# Patient Record
Sex: MALE | Race: WHITE | NOT HISPANIC OR LATINO | Employment: OTHER | URBAN - METROPOLITAN AREA
[De-identification: names, ages, dates, MRNs, and addresses within clinical notes are randomized per-mention and may not be internally consistent; named-entity substitution may affect disease eponyms.]

---

## 2018-07-05 ENCOUNTER — HOSPITAL ENCOUNTER (EMERGENCY)
Facility: HOSPITAL | Age: 68
Discharge: LEFT BEFORE DISCHARGE | End: 2018-07-05
Attending: EMERGENCY MEDICINE
Payer: OTHER GOVERNMENT

## 2018-07-05 ENCOUNTER — APPOINTMENT (EMERGENCY)
Dept: CT IMAGING | Facility: HOSPITAL | Age: 68
End: 2018-07-05
Payer: OTHER GOVERNMENT

## 2018-07-05 VITALS
TEMPERATURE: 97.6 F | DIASTOLIC BLOOD PRESSURE: 98 MMHG | OXYGEN SATURATION: 96 % | RESPIRATION RATE: 16 BRPM | HEART RATE: 80 BPM | SYSTOLIC BLOOD PRESSURE: 162 MMHG | WEIGHT: 182.32 LBS
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
## 2018-07-05 DIAGNOSIS — S22.089A CLOSED T12 FRACTURE (HCC): ICD-10-CM

## 2018-07-05 LAB
ANION GAP SERPL CALCULATED.3IONS-SCNC: 7 MMOL/L (ref 4–13)
ATRIAL RATE: 77 BPM
BASOPHILS # BLD AUTO: 0.02 THOUSANDS/ΜL (ref 0–0.1)
BASOPHILS NFR BLD AUTO: 0 % (ref 0–1)
BUN SERPL-MCNC: 24 MG/DL (ref 5–25)
CALCIUM SERPL-MCNC: 8.6 MG/DL (ref 8.3–10.1)
CHLORIDE SERPL-SCNC: 107 MMOL/L (ref 100–108)
CO2 SERPL-SCNC: 27 MMOL/L (ref 21–32)
CREAT SERPL-MCNC: 1.29 MG/DL (ref 0.6–1.3)
EOSINOPHIL # BLD AUTO: 0.01 THOUSAND/ΜL (ref 0–0.61)
EOSINOPHIL NFR BLD AUTO: 0 % (ref 0–6)
ERYTHROCYTE [DISTWIDTH] IN BLOOD BY AUTOMATED COUNT: 13.5 % (ref 11.6–15.1)
GFR SERPL CREATININE-BSD FRML MDRD: 56 ML/MIN/1.73SQ M
GLUCOSE SERPL-MCNC: 132 MG/DL (ref 65–140)
HCT VFR BLD AUTO: 42.1 % (ref 36.5–49.3)
HGB BLD-MCNC: 13.9 G/DL (ref 12–17)
IMM GRANULOCYTES # BLD AUTO: 0.08 THOUSAND/UL (ref 0–0.2)
IMM GRANULOCYTES NFR BLD AUTO: 1 % (ref 0–2)
LYMPHOCYTES # BLD AUTO: 0.73 THOUSANDS/ΜL (ref 0.6–4.47)
LYMPHOCYTES NFR BLD AUTO: 9 % (ref 14–44)
MCH RBC QN AUTO: 31.8 PG (ref 26.8–34.3)
MCHC RBC AUTO-ENTMCNC: 33 G/DL (ref 31.4–37.4)
MCV RBC AUTO: 96 FL (ref 82–98)
MONOCYTES # BLD AUTO: 0.42 THOUSAND/ΜL (ref 0.17–1.22)
MONOCYTES NFR BLD AUTO: 5 % (ref 4–12)
NEUTROPHILS # BLD AUTO: 6.67 THOUSANDS/ΜL (ref 1.85–7.62)
NEUTS SEG NFR BLD AUTO: 85 % (ref 43–75)
NRBC BLD AUTO-RTO: 0 /100 WBCS
P AXIS: 68 DEGREES
PLATELET # BLD AUTO: 134 THOUSANDS/UL (ref 149–390)
PMV BLD AUTO: 10 FL (ref 8.9–12.7)
POTASSIUM SERPL-SCNC: 4.3 MMOL/L (ref 3.5–5.3)
PR INTERVAL: 188 MS
QRS AXIS: -7 DEGREES
QRSD INTERVAL: 94 MS
QT INTERVAL: 376 MS
QTC INTERVAL: 425 MS
RBC # BLD AUTO: 4.37 MILLION/UL (ref 3.88–5.62)
SODIUM SERPL-SCNC: 141 MMOL/L (ref 136–145)
T WAVE AXIS: 55 DEGREES
TROPONIN I SERPL-MCNC: <0.02 NG/ML
VENTRICULAR RATE: 77 BPM
WBC # BLD AUTO: 7.93 THOUSAND/UL (ref 4.31–10.16)

## 2018-07-05 PROCEDURE — 93010 ELECTROCARDIOGRAM REPORT: CPT | Performed by: INTERNAL MEDICINE

## 2018-07-05 PROCEDURE — 93005 ELECTROCARDIOGRAM TRACING: CPT

## 2018-07-05 PROCEDURE — 96361 HYDRATE IV INFUSION ADD-ON: CPT

## 2018-07-05 PROCEDURE — 85025 COMPLETE CBC W/AUTO DIFF WBC: CPT | Performed by: EMERGENCY MEDICINE

## 2018-07-05 PROCEDURE — 74177 CT ABD & PELVIS W/CONTRAST: CPT

## 2018-07-05 PROCEDURE — 99284 EMERGENCY DEPT VISIT MOD MDM: CPT

## 2018-07-05 PROCEDURE — 96374 THER/PROPH/DIAG INJ IV PUSH: CPT

## 2018-07-05 PROCEDURE — 84484 ASSAY OF TROPONIN QUANT: CPT | Performed by: EMERGENCY MEDICINE

## 2018-07-05 PROCEDURE — 80048 BASIC METABOLIC PNL TOTAL CA: CPT | Performed by: EMERGENCY MEDICINE

## 2018-07-05 PROCEDURE — 36415 COLL VENOUS BLD VENIPUNCTURE: CPT | Performed by: EMERGENCY MEDICINE

## 2018-07-05 RX ORDER — ACETAMINOPHEN 325 MG/1
975 TABLET ORAL ONCE
Status: COMPLETED | OUTPATIENT
Start: 2018-07-05 | End: 2018-07-05

## 2018-07-05 RX ORDER — ACETAMINOPHEN 500 MG
500 TABLET ORAL EVERY 6 HOURS PRN
Qty: 30 TABLET | Refills: 0 | Status: SHIPPED | OUTPATIENT
Start: 2018-07-05

## 2018-07-05 RX ORDER — OXYCODONE HYDROCHLORIDE 5 MG/1
5 TABLET ORAL EVERY 4 HOURS PRN
Qty: 10 TABLET | Refills: 0 | Status: SHIPPED | OUTPATIENT
Start: 2018-07-05

## 2018-07-05 RX ORDER — LIDOCAINE 50 MG/G
1 PATCH TOPICAL ONCE
Status: DISCONTINUED | OUTPATIENT
Start: 2018-07-05 | End: 2018-07-05 | Stop reason: HOSPADM

## 2018-07-05 RX ORDER — KETOROLAC TROMETHAMINE 30 MG/ML
15 INJECTION, SOLUTION INTRAMUSCULAR; INTRAVENOUS ONCE
Status: COMPLETED | OUTPATIENT
Start: 2018-07-05 | End: 2018-07-05

## 2018-07-05 RX ORDER — NAPROXEN 500 MG/1
500 TABLET ORAL 2 TIMES DAILY WITH MEALS
Qty: 30 TABLET | Refills: 0 | Status: SHIPPED | OUTPATIENT
Start: 2018-07-05

## 2018-07-05 RX ORDER — OXYCODONE HYDROCHLORIDE 5 MG/1
5 TABLET ORAL ONCE
Status: COMPLETED | OUTPATIENT
Start: 2018-07-05 | End: 2018-07-05

## 2018-07-05 RX ADMIN — SODIUM CHLORIDE 1000 ML: 0.9 INJECTION, SOLUTION INTRAVENOUS at 09:59

## 2018-07-05 RX ADMIN — OXYCODONE HYDROCHLORIDE 5 MG: 5 TABLET ORAL at 11:32

## 2018-07-05 RX ADMIN — IOHEXOL 100 ML: 350 INJECTION, SOLUTION INTRAVENOUS at 10:55

## 2018-07-05 RX ADMIN — KETOROLAC TROMETHAMINE 15 MG: 30 INJECTION, SOLUTION INTRAMUSCULAR at 10:03

## 2018-07-05 RX ADMIN — LIDOCAINE 1 PATCH: 50 PATCH CUTANEOUS at 10:04

## 2018-07-05 RX ADMIN — ACETAMINOPHEN 975 MG: 325 TABLET, FILM COATED ORAL at 10:01

## 2018-07-05 NOTE — DISCHARGE INSTRUCTIONS
Vertebral Compression Fracture   WHAT YOU NEED TO KNOW:   A vertebral compression fracture (VCF) is a break in a part of the vertebra  Vertebrae are the round, strong bones that form your spine  VCFs most often occur in the thoracic (middle) and lumbar (lower) areas of your spine  Fractures may be mild to severe  DISCHARGE INSTRUCTIONS:   Medicines: You may need any of the following:  · NSAIDs , such as ibuprofen, help decrease swelling, pain, and fever  This medicine is available with or without a doctor's order  NSAIDs can cause stomach bleeding or kidney problems in certain people  If you take blood thinner medicine, always ask if NSAIDs are safe for you  Always read the medicine label and follow directions  Do not give these medicines to children under 10months of age without direction from your child's healthcare provider  · Acetaminophen  decreases pain and fever  It is available without a doctor's order  Ask how much to take and how often to take it  Follow directions  Acetaminophen can cause liver damage if not taken correctly  · Prescription pain medicine  may be given  Ask your healthcare provider how to take this medicine safely  · Bisphosphonates and calcitonin  may be recommended to help your bones get stronger  They can decrease the pain of a VCF caused by osteoporosis, and decrease your risk for another fracture  · Take your medicine as directed  Contact your healthcare provider if you think your medicine is not helping or if you have side effects  Tell him or her if you are allergic to any medicine  Keep a list of the medicines, vitamins, and herbs you take  Include the amounts, and when and why you take them  Bring the list or the pill bottles to follow-up visits  Carry your medicine list with you in case of an emergency  Follow up with your healthcare provider as directed: You may need to return for x-rays or other tests   Write down your questions so you remember to ask them during your visits  Heat and ice:   · Apply ice  on your back for 15 to 20 minutes every hour or as directed  Use an ice pack, or put crushed ice in a plastic bag  Cover it with a towel  Ice helps prevent tissue damage and decreases swelling and pain  · Apply heat  on your back for 20 to 30 minutes every 2 hours for as many days as directed  Heat helps decrease pain and muscle spasms  Activity:   · Avoid activities that may make the pain worse, such as picking up heavy objects  When the pain decreases, begin normal, slow movements as directed by your healthcare provider  Your healthcare provider may have you do weight-bearing exercises such as walking  You may also do non-weight-bearing exercises such as swimming and bicycling  · You may need to use a walker or cane  Ask your healthcare provider for more information about how to use a cane or a walker  · When you  objects, bend at the hips and knees  Never bend from the waist only  Use bent knees and your leg muscles as you lift the object  While you lift the object, keep it close to your chest  Try not to twist or lift anything above your waist   Physical and occupational therapy:  Your healthcare provider may recommend physical and occupational therapy  A physical therapist teaches you exercises to help improve movement and strength, and to decrease pain  An occupational therapist teaches you skills to help with your daily activities  Manage pain during sleep:   · Do not sleep on a waterbed  Waterbeds do not provide good back support  · Sleep on a firm mattress  You may also put a ½ to 1-inch piece of plywood between the mattress and box spring  · Sleep on your back with a pillow under your knees  This will decrease pressure on your back  You may also sleep on your side with 1 or both of your knees bent and a pillow between them  It may also be helpful to sleep on your stomach with a pillow under you at waist level    Contact your healthcare provider if:   · You are not hungry, and you are losing weight  · You cannot sleep or rest because of back pain  · You have pain or swelling in your back that is getting worse, or does not go away  · You have questions or concerns about your condition or care  Return to the emergency department if:   · You feel lightheaded, short of breath, and have chest pain  · You cough up blood  · Your arm or leg feels warm, tender, and painful  It may look swollen and red  · You have new problems urinating or having bowel movements  · You have severe pain in your back after falling, bending forward, sneezing, or coughing strongly  · You suddenly cannot feel your legs  · You suddenly have trouble moving your arms or legs  © 2017 [facility] Information is for End User's use only and may not be sold, redistributed or otherwise used for commercial purposes  All illustrations and images included in CareNotes® are the copyrighted property of A D A M , Inc  or Miguel Chau  The above information is an  only  It is not intended as medical advice for individual conditions or treatments  Talk to your doctor, nurse or pharmacist before following any medical regimen to see if it is safe and effective for you

## 2018-07-05 NOTE — ED NOTES
Pt ambulated with assistance to the bathroom   Pt has steady gait with tremors, ambulates at baseline mobility given history or Jennifer 38, RN  07/05/18 9055

## 2018-07-05 NOTE — ED NOTES
Patient transported to [facility], RN  07/05/18 9949

## 2018-07-05 NOTE — ED PROVIDER NOTES
History  Chief Complaint   Patient presents with   [place] Day Fall     pt states that he was on his way out of the bathroom and lost his balance going backwards causing him to fall  c/o lower back pain  denies any head injury or loss of consciousness     HPI  56-year-old male history of Parkinson's disease presents after mechanical fall with low back pain  Patient says he was up around 5 o'clock this morning getting ready to feed his dogs when he lost his balance and fell backwards onto his buttock  Patient denies hitting his head or any loss of consciousness he denies any lightheadedness or dizziness prior fall  Patient is not on any blood thinners  Patient is complaining of pain in his lumbar area that radiates to the buttock  He was able to get up on his own as well as walk after the fall  He uses a cane at baseline  Patient says he is only on Sinemet he is not on any other medications currently  He does have a history of an aortic valve replacement the past as well  He is complaining of pain in his low back as well as buttock  He denies any pain anywhere else no weakness, numbness, tingling, chest pain, shortness of breath, headache, neck stiffness, speech difficulties  Patient denies any recent fevers or chills  Twelve systems reviewed otherwise negative except as stated in HPI  Impression and plan 56-year-old male who presents status post fall with low back/buttock pain  He says fall was mechanical in nature  Complaining of pain in the lumbar area as well as buttock  He does have a history of Parkinson's disease he is neurovascularly intact on exam not on any blood thinners  I will get CT abdomen pelvis to rule out injury check basic labs as well an EKG and troponin treat pain reassess  None       Past Medical History:   Diagnosis Date    Parkinson's disease [facility])        Past Surgical History:   Procedure Laterality Date    AORTIC VALVE REPLACEMENT      CARDIAC SURGERY         History reviewed  No pertinent family history  I have reviewed and agree with the history as documented  Social History   Substance Use Topics    Smoking status: Never Smoker    Smokeless tobacco: Never Used    Alcohol use No        Review of Systems   Constitutional: Negative for activity change, chills and fever  HENT: Negative for trouble swallowing and voice change  Eyes: Negative for photophobia  Respiratory: Negative for shortness of breath  Cardiovascular: Negative for chest pain, palpitations and leg swelling  Gastrointestinal: Negative for abdominal pain, diarrhea, nausea and vomiting  Endocrine: Negative for polyuria  Genitourinary: Negative for dysuria  Musculoskeletal: Negative for arthralgias, back pain, gait problem, joint swelling, myalgias, neck pain and neck stiffness  Skin: Negative for rash and wound  Allergic/Immunologic: Negative  Neurological: Negative for dizziness, tremors, seizures, syncope, facial asymmetry, speech difficulty, weakness, light-headedness, numbness and headaches  Hematological: Negative for adenopathy  Does not bruise/bleed easily  Psychiatric/Behavioral: Negative for agitation  Physical Exam  Physical Exam   Constitutional: He is oriented to person, place, and time  He appears well-developed and well-nourished  No distress  HENT:   Head: Normocephalic and atraumatic  Right Ear: No hemotympanum  Left Ear: No hemotympanum  Nose: No nasal septal hematoma  Mouth/Throat: Uvula is midline and oropharynx is clear and moist  No oropharyngeal exudate  Eyes: EOM are normal  Pupils are equal, round, and reactive to light  Right eye exhibits no discharge  Left eye exhibits no discharge  No scleral icterus  Neck: Normal range of motion  Neck supple  No JVD present  No tracheal deviation present  No thyromegaly present  No -c-spine tenderness   Cardiovascular: Normal rate, regular rhythm, normal heart sounds and intact distal pulses    Exam reveals no gallop and no friction rub  No murmur heard  Pulmonary/Chest: Effort normal and breath sounds normal  No stridor  No respiratory distress  He has no wheezes  He has no rales  He exhibits no tenderness  Abdominal: Soft  Bowel sounds are normal  He exhibits no distension and no mass  There is no tenderness  There is no rebound and no guarding  No hernia  Musculoskeletal: Normal range of motion  He exhibits no edema, tenderness or deformity  No t, l spine tenderness  No stepoff/deformities  No skin changes  L lumbar paraspinal tenderness, L buttock tenderness  No ecchymosis or skin changes   Lymphadenopathy:     He has no cervical adenopathy  Neurological: He is alert and oriented to person, place, and time  He has normal strength and normal reflexes  He is not disoriented  No cranial nerve deficit or sensory deficit  GCS eye subscore is 4  GCS verbal subscore is 5  GCS motor subscore is 6  Reflex Scores:       Patellar reflexes are 2+ on the right side and 2+ on the left side  Achilles reflexes are 2+ on the right side and 2+ on the left side  Cn 2-12 grossly intact  No pronator drift  Normal gait  Normal strength/sensation   Skin: Skin is warm and dry  Capillary refill takes less than 2 seconds  He is not diaphoretic  No erythema  No pallor  Psychiatric: He has a normal mood and affect  Nursing note and vitals reviewed        Vital Signs  ED Triage Vitals [07/05/18 0922]   Temperature Pulse Respirations Blood Pressure SpO2   97 6 °F (36 4 °C) 81 16 (!) 175/89 96 %      Temp Source Heart Rate Source Patient Position - Orthostatic VS BP Location FiO2 (%)   Oral Monitor Sitting Right arm --      Pain Score       9           Vitals:    07/05/18 0922 07/05/18 1111 07/05/18 1243   BP: (!) 175/89 165/63 162/98   Pulse: 81 72 80   Patient Position - Orthostatic VS: Sitting Lying Lying       Visual Acuity  Visual Acuity      Most Recent Value   L Pupil Size (mm)  4   R Pupil Size (mm)  4 ED Medications  Medications   sodium chloride 0 9 % bolus 1,000 mL (0 mL Intravenous Stopped 7/5/18 1135)   ketorolac (TORADOL) injection 15 mg (15 mg Intravenous Given 7/5/18 1003)   acetaminophen (TYLENOL) tablet 975 mg (975 mg Oral Given 7/5/18 1001)   iohexol (OMNIPAQUE) 350 MG/ML injection (MULTI-DOSE) 100 mL (100 mL Intravenous Given 7/5/18 1055)   oxyCODONE (ROXICODONE) IR tablet 5 mg (5 mg Oral Given 7/5/18 1132)       Diagnostic Studies  Results Reviewed     Procedure Component Value Units Date/Time    Troponin I [[id number]]  (Normal) Collected:  07/05/18 1001    Lab Status:  Final result Specimen:  Blood from Arm, Right Updated:  07/05/18 1031     Troponin I <0 02 ng/mL     Basic metabolic panel [[id number]] Collected:  07/05/18 1001    Lab Status:  Final result Specimen:  Blood from Arm, Right Updated:  07/05/18 1022     Sodium 141 mmol/L      Potassium 4 3 mmol/L      Chloride 107 mmol/L      CO2 27 mmol/L      Anion Gap 7 mmol/L      BUN 24 mg/dL      Creatinine 1 29 mg/dL      Glucose 132 mg/dL      Calcium 8 6 mg/dL      eGFR 56 ml/min/1 73sq m     Narrative:         National Kidney Disease Education Program recommendations are as follows:  GFR calculation is accurate only with a steady state creatinine  Chronic Kidney disease less than 60 ml/min/1 73 sq  meters  Kidney failure less than 15 ml/min/1 73 sq  meters      CBC and differential [[id number]]  (Abnormal) Collected:  07/05/18 1001    Lab Status:  Final result Specimen:  Blood from Arm, Right Updated:  07/05/18 1013     WBC 7 93 Thousand/uL      RBC 4 37 Million/uL      Hemoglobin 13 9 g/dL      Hematocrit 42 1 %      MCV 96 fL      MCH 31 8 pg      MCHC 33 0 g/dL      RDW 13 5 %      MPV 10 0 fL      Platelets 319 (L) Thousands/uL      nRBC 0 /100 WBCs      Neutrophils Relative 85 (H) %      Immat GRANS % 1 %      Lymphocytes Relative 9 (L) %      Monocytes Relative 5 %      Eosinophils Relative 0 %      Basophils Relative 0 %      Neutrophils Absolute 6 67 Thousands/µL      Immature Grans Absolute 0 08 Thousand/uL      Lymphocytes Absolute 0 73 Thousands/µL      Monocytes Absolute 0 42 Thousand/µL      Eosinophils Absolute 0 01 Thousand/µL      Basophils Absolute 0 02 Thousands/µL                  CT abdomen pelvis with contrast   Final Result by Kishan Albert MD (07/05 1113)      T12  fracture with about 20-25% loss of vertebral height with intact and posterior arches  No widening of the interspinous space and no central canal narrowing      Bibasilar density seen suggests atelectasis  Consider follow-up at 3 months to demonstrate resolution of the basilar opacities in the lung   No solid visceral injury seen      The study was marked in EPIC for immediate notification  Workstation performed: [id number]                    Procedures  ECG 12 Lead Documentation  Date/Time: 7/5/2018 10:22 AM  Performed by: Natali Knutson by: Aleksandr Larkin     ECG reviewed by me, the ED Provider: yes    Patient location:  ED  Previous ECG:     Previous ECG:  Unavailable  Interpretation:     Interpretation: normal    Rate:     ECG rate:  77    ECG rate assessment: normal    Rhythm:     Rhythm: sinus rhythm    Ectopy:     Ectopy: none    QRS:     QRS axis:  Normal  Conduction:     Conduction: normal    ST segments:     ST segments:  Normal  T waves:     T waves: normal             Phone Contacts  ED Phone Contact    ED Course  ED Course as of Jul 05 1923   Thu Jul 05, 2018   1028 Creatinine: 1 29   1124 Pt updated about t12  fracture  He is not sure if he wants to stay because [facility]  I will give pt  Oxycodone, disc for ct results, reassess   If he decides to leave, he will have to sign out ama                                MDM  CritCare Time    Disposition  Final diagnoses:   Fall   Closed T12 fracture ([address] )     Time reflects when diagnosis was documented in both MDM as applicable and the Disposition within this note     Time User Action Codes Description Comment    7/5/2018 12:02 PM Chasidy Jain Add [E72  XXXA] Fall     7/5/2018 12:02 PM Jamessohail Sena GROVE Add [C11 [id number]] Closed T12 fracture [facility])       ED Disposition     ED Disposition Condition Comment    AMA  Date: 7/5/2018  Patient: Kennedy Castaneda  Admitted: 7/5/2018  9:19 AM  Attending Provider: Shakila Mak MD    Kennedy Castaneda or his authorized caregiver has made the decision for the patient to leave the emergency department against the advice of [facility] emergency department staff  He or his authorized caregiver has been informed and understands the inherent risks, including death, permanent disability  He or his authorized caregiver has decided to accept the responsibility for this decision  Kennedy Castaneda and all necessary parties have been advised that he may return for further evaluation or treatment  His condition at time of discharge was stable    Kennedy Castaneda had current vital signs as follows:  /63 (BP Location: Left arm)   Pulse  72   Temp 97 6 °F (36 4 °C) (Oral)   Resp 20   Wt 82 7 kg (182 lb 5 1 oz)         Follow-up Information     Follow up With Specialties Details Why Contact Info Additional Information    [address] [facility] In 2 days  [address] Emergency Department Emergency Medicine  If symptoms worsen [address] [facility] [address]  [phone] [place] ED, [address] [place], [place], [address]          Discharge Medication List as of 7/5/2018 12:04 PM      START taking these medications    Details   acetaminophen (TYLENOL) 500 mg tablet Take 1 tablet (500 mg total) by mouth every 6 (six) hours as needed for mild pain, Starting Thu 7/5/2018, Print      naproxen (NAPROSYN) 500 mg tablet Take 1 tablet (500 mg total) by mouth 2 (two) times a day with meals, Starting Thu 7/5/2018, Print      oxyCODONE (ROXICODONE) 5 mg immediate release tablet Take 1 tablet (5 mg total) by mouth every 4 (four) hours as needed for moderate pain for up to 10 doses Max Daily Amount: 30 mg, Starting Thu 7/5/2018, Print           No discharge procedures on file      ED Provider  Electronically Signed by           Dat Dave MD  07/05/18 6115

## 2018-08-13 ENCOUNTER — EVALUATION (OUTPATIENT)
Dept: PHYSICAL THERAPY | Facility: MEDICAL CENTER | Age: 68
End: 2018-08-13
Payer: OTHER GOVERNMENT

## 2018-08-13 DIAGNOSIS — R26.89 BALANCE PROBLEM: Primary | ICD-10-CM

## 2018-08-13 PROCEDURE — G8978 MOBILITY CURRENT STATUS: HCPCS | Performed by: PHYSICAL THERAPIST

## 2018-08-13 PROCEDURE — G8979 MOBILITY GOAL STATUS: HCPCS | Performed by: PHYSICAL THERAPIST

## 2018-08-13 PROCEDURE — 97110 THERAPEUTIC EXERCISES: CPT | Performed by: PHYSICAL THERAPIST

## 2018-08-13 PROCEDURE — 97162 PT EVAL MOD COMPLEX 30 MIN: CPT | Performed by: PHYSICAL THERAPIST

## 2018-08-13 NOTE — PROGRESS NOTES
PT Evaluation     Today's date: 2018  Patient name: Ariadna Kinney  : 1950  MRN: [id number]  Referring provider: Wiliam Velasco MD  Dx:   Encounter Diagnosis     ICD-10-CM    1  Balance problem R26 89                   Assessment  Impairments: abnormal gait, abnormal movement, impaired physical strength and safety issue    Assessment details: Ariadna Kinney is a 69 y o  male who presents with decreased strength, ambulatory dysfunction, and impaired balance  Due to these impairments, patient has difficulty performing a/iadls  Patient's clinical presentation is consistent with their referring diagnosis of balance/gait dysfunction  Patient would benefit from skilled physical therapy to address their aforementioned impairments, improve their level of function and to improve their overall quality of life  Understanding of Dx/Px/POC: good   Prognosis: fair    Goals  Short term goals  to be achieved in 4 weeks:     Increase strength by 1/2 grade  Balance will be improved as indicated by tandem stance of at least 30 seconds  Long term goals  to be achieved by discharge:    Ambulation is improved to maximal level of function  Squatting is improved to maximal level of function  Stair climbing is improved to maximal level of function  IADL performance in related activities is improved to maximal level of function  Plan  Planned therapy interventions: neuromuscular re-education, patient education, strengthening, stretching, therapeutic activities, therapeutic exercise, gait training and home exercise program  Frequency: 2x week  Duration in visits: 12  Duration in weeks: 6  Plan of Care beginning date: 2018  Plan of Care expiration date: 2018  Treatment plan discussed with: patient        Subjective Evaluation    History of Present Illness  Mechanism of injury: Patient states decreased gait and balance over the past six months attributed to Parkinson's disease    Patient sustained fall on 18 when he fell backwards in his house; patient received CT scan which revealed T12 fracture with approximately 20-25% loss of vertebral height  Patient states he sustained another fall in the spring from loss of balance; states no significant injury from that fall  Patient states he uses a SPC in his home during the evening and outside of his home as needed  Patient has not received recent PT for balance/gait dysfunction    Pain  Current pain ratin  At best pain ratin  At worst pain ratin          Objective     Strength/Myotome Testing     Left Hip   Planes of Motion   Flexion: 4  Extension: 4  Abduction: 4  Adduction: 4    Right Hip   Planes of Motion   Flexion: 4  Extension: 4  Abduction: 4  Adduction: 4    Left Knee   Flexion: 4+  Extension: 4+    Right Knee   Flexion: 4+  Extension: 4+    Left Ankle/Foot   Dorsiflexion: 4+  Plantar flexion: 4+    Right Ankle/Foot   Dorsiflexion: 4+  Plantar flexion: 4+    Functional Assessment     Comments  Tandem stance: loss of balance 8 seconds  Standing feet together firm surface/eyes closed: moderate sway  Single limb stance: left LE: 3 seconds, loss of balance, right LE: 4 seconds, loss of balance      Flowsheet Rows      Most Recent Value   PT/OT G-Codes   Current Score  79   FOTO information reviewed  Yes   Assessment Type  Evaluation   G code set  Mobility: Walking & Moving Around   Mobility: Walking and Moving Around Current Status ()  CK   Mobility: Walking and Moving Around Goal Status ()  CJ          Precautions: Parkinson's disease, Aortic valve replacement - 2009    Daily Treatment Diary     Manual                                                                                   Exercise Diary              Bike             Mini squats HEP            Heel raises             Tandem stance HEP            S/S stepping             Tandem stepping             Biodex - weight shift             Biodex - LOS SLR flex HEP            SLR abd HEP            Bridging             Iso hip add                                                                                                                           Modalities

## 2018-08-13 NOTE — LETTER
2018    Bekah Jean Baptiste, [address] [facility]  [address] [place] [address] Sudha Earl [facility] [address]    Patient: Prince Ford   YOB: 1950   Date of Visit: 2018     Encounter Diagnosis     ICD-10-CM    1  Balance problem R26 89        Dear Dr Roger Mirza:    Please review the attached Plan of Care from [address] [place] [address] recent visit  Please verify that you agree therapy should continue by signing the attached document and sending it back to our office  If you have any questions or concerns, please don't hesitate to call  Sincerely,    Yanelis Meza, PT      Referring Provider:      I certify that I have read the below Plan of Care and certify the need for these services furnished under this plan of treatment while under my care  Bekah Jean Baptiste MD  [address] [place] [address]  VIA Facsimile: [phone]          PT Evaluation     Today's date: 2018  Patient name: Prince Ford  : 1950  MRN: [id number]  Referring provider: Suzanne Delarosa MD  Dx:   Encounter Diagnosis     ICD-10-CM    1  Balance problem R26 89                   Assessment  Impairments: abnormal gait, abnormal movement, impaired physical strength and safety issue    Assessment details: Prince Ford is a 69 y o  male who presents with decreased strength, ambulatory dysfunction, and impaired balance  Due to these impairments, patient has difficulty performing a/iadls  Patient's clinical presentation is consistent with their referring diagnosis of balance/gait dysfunction  Patient would benefit from skilled physical therapy to address their aforementioned impairments, improve their level of function and to improve their overall quality of life  Understanding of Dx/Px/POC: good   Prognosis: fair    Goals  Short term goals  to be achieved in 4 weeks:     Increase strength by 1/2 grade     Balance will be improved as indicated by tandem stance of at least 30 seconds  Long term goals  to be achieved by discharge:    Ambulation is improved to maximal level of function  Squatting is improved to maximal level of function  Stair climbing is improved to maximal level of function  IADL performance in related activities is improved to maximal level of function  Plan  Planned therapy interventions: neuromuscular re-education, patient education, strengthening, stretching, therapeutic activities, therapeutic exercise, gait training and home exercise program  Frequency: 2x week  Duration in visits: 12  Duration in weeks: 6  Plan of Care beginning date: 2018  Plan of Care expiration date: 2018  Treatment plan discussed with: patient        Subjective Evaluation    History of Present Illness  Mechanism of injury: Patient states decreased gait and balance over the past six months attributed to Parkinson's disease  Patient sustained fall on 18 when he fell backwards in his house; patient received CT scan which revealed T12 fracture with approximately 20-25% loss of vertebral height  Patient states he sustained another fall in the spring from loss of balance; states no significant injury from that fall  Patient states he uses a SPC in his home during the evening and outside of his home as needed  Patient has not received recent PT for balance/gait dysfunction    Pain  Current pain ratin  At best pain ratin  At worst pain ratin          Objective     Strength/Myotome Testing     Left Hip   Planes of Motion   Flexion: 4  Extension: 4  Abduction: 4  Adduction: 4    Right Hip   Planes of Motion   Flexion: 4  Extension: 4  Abduction: 4  Adduction: 4    Left Knee   Flexion: 4+  Extension: 4+    Right Knee   Flexion: 4+  Extension: 4+    Left Ankle/Foot   Dorsiflexion: 4+  Plantar flexion: 4+    Right Ankle/Foot   Dorsiflexion: 4+  Plantar flexion: 4+    Functional Assessment     Comments  Tandem stance: loss of balance 8 seconds  Standing feet together firm surface/eyes closed: moderate sway  Single limb stance: left LE: 3 seconds, loss of balance, right LE: 4 seconds, loss of balance      Flowsheet Rows      Most Recent Value   PT/OT G-Codes   Current Score  79   FOTO information reviewed  Yes   Assessment Type  Evaluation   G code set  Mobility: Walking & Moving Around   Mobility: Walking and Moving Around Current Status ()  CK   Mobility: Walking and Moving Around Goal Status ()  CJ          Precautions: Parkinson's disease, Aortic valve replacement - 4/2009    Daily Treatment Diary     Manual                                                                                   Exercise Diary  8/13            Bike             Mini squats HEP            Heel raises             Tandem stance HEP            S/S stepping             Tandem stepping             Biodex - weight shift             Biodex - LOS             SLR flex HEP            SLR abd HEP            Bridging             Iso hip add                                                                                                                           Modalities

## 2018-08-14 ENCOUNTER — TRANSCRIBE ORDERS (OUTPATIENT)
Dept: PHYSICAL THERAPY | Facility: MEDICAL CENTER | Age: 68
End: 2018-08-14

## 2018-08-14 DIAGNOSIS — R26.89 HABITUAL TOEWALKING: Primary | ICD-10-CM

## 2018-08-17 ENCOUNTER — OFFICE VISIT (OUTPATIENT)
Dept: PHYSICAL THERAPY | Facility: MEDICAL CENTER | Age: 68
End: 2018-08-17
Payer: OTHER GOVERNMENT

## 2018-08-17 DIAGNOSIS — R26.89 BALANCE PROBLEM: Primary | ICD-10-CM

## 2018-08-17 PROCEDURE — 97112 NEUROMUSCULAR REEDUCATION: CPT | Performed by: PHYSICAL THERAPIST

## 2018-08-17 PROCEDURE — 97110 THERAPEUTIC EXERCISES: CPT | Performed by: PHYSICAL THERAPIST

## 2018-08-17 NOTE — PROGRESS NOTES
Daily Note     Today's date: 2018  Patient name: Lisa Park  : 1950  MRN: [id number]  Referring provider: Radha Mak MD  Dx:   Encounter Diagnosis     ICD-10-CM    1  Balance problem R26 89                   Subjective: Patient stated moderate difficulty with balance activity in HEP  Objective: See treatment diary below  Precautions: Parkinson's disease, Aortic valve replacement - 2009, FALL RISK     Daily Treatment Diary         Exercise Diary                     Bike    6'                   Mini squats HEP  20x                   HR/TR    30 ea                   Tandem stance HEP  foam2' ea                    S/S stepping    4 laps @ bar                   Tandem stepping    4 laps @ bar                   Biodex - weight shift - s/s + f/b    static 3' ea                    Biodex - LOS    static 3x                   SLR flex HEP  20 ea                   SLR abd HEP  20 ea                   Bridging    3"x20                   Iso hip add     5"x20                                                                                                                                                                                                                   Direct supervision - 8:15 - 8:55       Assessment: Patient demonstrated greatest difficulty with Biodex activities and tandem stance on foam; moderate fatigue noted with activities; no c/o at completion of treatment session  Plan: Continue per plan of care

## 2018-08-21 ENCOUNTER — APPOINTMENT (OUTPATIENT)
Dept: PHYSICAL THERAPY | Facility: MEDICAL CENTER | Age: 68
End: 2018-08-21
Payer: OTHER GOVERNMENT

## 2018-08-23 ENCOUNTER — OFFICE VISIT (OUTPATIENT)
Dept: PHYSICAL THERAPY | Facility: MEDICAL CENTER | Age: 68
End: 2018-08-23
Payer: OTHER GOVERNMENT

## 2018-08-23 DIAGNOSIS — R26.89 BALANCE PROBLEM: Primary | ICD-10-CM

## 2018-08-23 PROCEDURE — 97112 NEUROMUSCULAR REEDUCATION: CPT

## 2018-08-23 NOTE — PROGRESS NOTES
Daily Note     Today's date: 2018  Patient name: Sangeeta Vincent  : 1950  MRN: [id number]  Referring provider: Adrianna Tobias MD  Dx:   Encounter Diagnosis     ICD-10-CM    1  Balance problem R26 89                   Subjective: Patient denies any recent falls  He also denies any changes in balance since starting PT  Objective: See treatment diary below  Precautions: Parkinson's disease, Aortic valve replacement - 2009, FALL RISK     Daily Treatment Diary         Exercise Diary                   Bike    6'  5 '                 Mini squats HEP  20x  20x                 HR/TR    30 ea  30ea                 Tandem stance HEP  foam2' ea   2' ea                 S/S stepping    4 laps @ bar  4 laps                 Tandem stepping    4 laps @ bar  4 laps                 Biodex - weight shift - s/s + f/b    static 3' ea  Held unable                 Biodex - LOS    static 3x Held unable                 SLR flex HEP  20 ea  20 ea                  SLR abd HEP  20 ea  20 ea                 Bridging    3"x20  3"x20                 Iso hip add     5"x20  5"x20                                                                                                                                                                                                                        Assessment: Patient unable to perform biodex today secondary to dyskinesia  Plan to perform at beginning of tx nv and see if this helps  Pt also notes he changed his medication routine today which could be contributing to having more severe symptoms today  Plan: Continue per plan of care

## 2018-08-30 ENCOUNTER — OFFICE VISIT (OUTPATIENT)
Dept: PHYSICAL THERAPY | Facility: MEDICAL CENTER | Age: 68
End: 2018-08-30
Payer: OTHER GOVERNMENT

## 2018-08-30 DIAGNOSIS — R42 DIZZINESS: Primary | ICD-10-CM

## 2018-08-30 PROCEDURE — 97110 THERAPEUTIC EXERCISES: CPT

## 2018-08-30 PROCEDURE — 97112 NEUROMUSCULAR REEDUCATION: CPT

## 2018-08-30 NOTE — PROGRESS NOTES
Daily Note     Today's date: 2018  Patient name: Aaron Beckford  : 1950  MRN: [id number]  Referring provider: Stefan Ansari MD  Dx:   Encounter Diagnosis     ICD-10-CM    1  Dizziness R42                   Subjective: Patient states he's feeling "pretty good"  Objective: See treatment diary below  Precautions: Parkinson's disease, Aortic valve replacement - 2009, FALL RISK     Daily Treatment Diary         Exercise Diary                 Bike    6'  5 '  5'               Mini squats HEP  20x  20x 20x               HR/TR    30 ea  30ea  30x               Tandem stance HEP  foam2' ea   2' ea  2' ea               S/S stepping    4 laps @ bar  4 laps  4 laps               Tandem stepping    4 laps @ bar  4 laps  4 laps               Biodex - weight shift - s/s + f/b    static 3' ea  Held unable  np               Biodex - LOS    static 3x Held unable  static 1x               SLR flex HEP  20 ea  20 ea   20x               SLR abd HEP  20 ea  20 ea  20x               Bridging    3"x20  3"x20  5"x20               Iso hip add     5"x20  5"x20  5"x20                                                                                                                                                                                                                      Assessment: Patient had difficulty again with biodex due to dyskinesia in RLE  Pt reported fatigue post        Plan: Continue per plan of care

## 2018-09-04 ENCOUNTER — OFFICE VISIT (OUTPATIENT)
Dept: PHYSICAL THERAPY | Facility: MEDICAL CENTER | Age: 68
End: 2018-09-04
Payer: OTHER GOVERNMENT

## 2018-09-04 DIAGNOSIS — R26.89 BALANCE PROBLEM: ICD-10-CM

## 2018-09-04 DIAGNOSIS — R42 DIZZINESS: Primary | ICD-10-CM

## 2018-09-04 PROCEDURE — 97112 NEUROMUSCULAR REEDUCATION: CPT | Performed by: PHYSICAL THERAPIST

## 2018-09-04 NOTE — PROGRESS NOTES
Daily Note     Today's date: 2018  Patient name: Kennedy Castaneda  : 1950  MRN: [id number]  Referring provider: Stevie Valencia MD  Dx:   Encounter Diagnosis     ICD-10-CM    1  Dizziness R42    2  Balance problem R26 89                   Subjective: Patient offers no new complaints  Objective: See treatment diary below  Precautions: Parkinson's disease, Aortic valve replacement - 2009, FALL RISK     Daily Treatment Diary         Exercise Diary               Bike    6'  5 '  5'  5'             Mini squats HEP  20x  20x 20x  20x             HR/TR    30 ea  30ea  30x  30x             Tandem stance HEP  foam2' ea   2' ea  2' ea  2' ea             S/S stepping    4 laps @ bar  4 laps  4 laps  4 laps             Tandem stepping    4 laps @ bar  4 laps  4 laps  4 laps             Biodex - weight shift - s/s + f/b    static 3' ea  Held unable  np  np             Biodex - LOS    static 3x Held unable  static 1x  static 1x             SLR flex HEP  20 ea  20 ea   20x  20x              SLR abd HEP  20 ea  20 ea  20x  20x              Bridging    3"x20  3"x20  5"x20  5"20x             Iso hip add     5"x20  5"x20  5"x20  5" 20x                                                                                                                                                                                                                    Assessment: Patient had difficulty with tandem stance with right foot in front  Lost balance in the posterior direction  Able to complete all exercises  Plan: Continue per plan of care

## 2018-09-06 ENCOUNTER — OFFICE VISIT (OUTPATIENT)
Dept: PHYSICAL THERAPY | Facility: MEDICAL CENTER | Age: 68
End: 2018-09-06
Payer: OTHER GOVERNMENT

## 2018-09-06 DIAGNOSIS — R26.89 BALANCE PROBLEM: ICD-10-CM

## 2018-09-06 DIAGNOSIS — R42 DIZZINESS: Primary | ICD-10-CM

## 2018-09-06 PROCEDURE — 97112 NEUROMUSCULAR REEDUCATION: CPT | Performed by: PHYSICAL THERAPIST

## 2018-09-06 PROCEDURE — G8991 OTHER PT/OT GOAL STATUS: HCPCS | Performed by: PHYSICAL THERAPIST

## 2018-09-06 PROCEDURE — G8990 OTHER PT/OT CURRENT STATUS: HCPCS | Performed by: PHYSICAL THERAPIST

## 2018-09-06 PROCEDURE — 97110 THERAPEUTIC EXERCISES: CPT | Performed by: PHYSICAL THERAPIST

## 2018-09-06 NOTE — PROGRESS NOTES
Daily Note     Today's date: 2018  Patient name: Kvng Law  : 1950  MRN: [id number]  Referring provider: Carole Bass MD  Dx:   Encounter Diagnosis     ICD-10-CM    1  Dizziness R42    2  Balance problem R26 89                   Subjective: Pt notes difficulty carrying items at home      Objective: See treatment diary below      Assessment: Tolerated treatment well  Patient would benefit from continued PT      Plan: Continue per plan of care  Precautions: Parkinson's disease, Aortic valve replacement - 2009, FALL RISK     Daily Treatment Diary         Exercise Diary             Bike    6'  5 '  5'  5'  5'           Mini squats HEP  20x  20x 20x  20x  20x           HR/TR    30 ea  30ea  30x  30x  30x           Tandem stance HEP  foam2' ea   2' ea  2' ea  2' ea  2'           S/S stepping    4 laps @ bar  4 laps  4 laps  4 laps  4 laps           Tandem stepping    4 laps @ bar  4 laps  4 laps  4 laps  4 laps           Biodex - weight shift - s/s + f/b    static 3' ea   Held unable  np  np             Biodex - LOS    static 3x Held unable  static 1x  static 1x  L10 2x           SLR flex HEP  20 ea  20 ea   20x  20x   20x           SLR abd HEP  20 ea  20 ea  20x  20x   20x           Bridging    3"x20  3"x20  5"x20  5"20x  :05/20x           Iso hip add     5"x20  5"x20  5"x20  5" 20x  :05/20x           Biodex Maze          L10 easy 1x

## 2018-09-11 ENCOUNTER — OFFICE VISIT (OUTPATIENT)
Dept: PHYSICAL THERAPY | Facility: MEDICAL CENTER | Age: 68
End: 2018-09-11
Payer: OTHER GOVERNMENT

## 2018-09-11 DIAGNOSIS — R42 DIZZINESS: Primary | ICD-10-CM

## 2018-09-11 DIAGNOSIS — R26.89 BALANCE PROBLEM: ICD-10-CM

## 2018-09-11 PROCEDURE — G8991 OTHER PT/OT GOAL STATUS: HCPCS | Performed by: PHYSICAL THERAPIST

## 2018-09-11 PROCEDURE — 97112 NEUROMUSCULAR REEDUCATION: CPT | Performed by: PHYSICAL THERAPIST

## 2018-09-11 PROCEDURE — 97110 THERAPEUTIC EXERCISES: CPT | Performed by: PHYSICAL THERAPIST

## 2018-09-11 PROCEDURE — G8990 OTHER PT/OT CURRENT STATUS: HCPCS | Performed by: PHYSICAL THERAPIST

## 2018-09-11 NOTE — PROGRESS NOTES
PT Re-Evaluation    Today's date: 2018  Patient name: Maribell Arroyo  : 1950  MRN: [id number]  Referring provider: Corina Schofield MD  Dx:   Encounter Diagnosis     ICD-10-CM    1  Dizziness R42    2  Balance problem R26 89                   Assessment  Impairments: abnormal gait, abnormal movement and impaired physical strength    Assessment details: Patient has been compliant with attending PT and home exercise program since initial eval   He  has made progress towards his goals and improvements in objective data since initial eval but is still limited compared to prior level of function  He continues with to have above listed impairments and would benefit from additional skilled PT to address these deficits to return to maximal level of function  However, he wishes to perform HEP  He will be seen 1 more visit to update/review HEP then will attempt home therapy  He will return to PT only as needed thereafter  Thank you for this pleasant referral       Understanding of Dx/Px/POC: good   Prognosis: fair    Goals  Short term goals - to be achieved in 4 weeks:     Increase strength by 1/2 grade -Met   Balance will be improved as indicated by tandem stance of at least 30 seconds  -Met  Long term goals - to be achieved by discharge:    Ambulation is improved to maximal level of function -Partially Met   Squatting is improved to maximal level of function - Met   Stair climbing is improved to maximal level of function  -Met   IADL performance in related activities is improved to maximal level of function   -Partially met      Plan  Planned therapy interventions: neuromuscular re-education, patient education, strengthening, stretching, therapeutic activities, therapeutic exercise, gait training and home exercise program  Frequency: 2x week  Duration in weeks: 6  Plan of Care beginning date: 2018  Plan of Care expiration date: 10/9/2018  Treatment plan discussed with: patient        Subjective Evaluation    History of Present Illness  Mechanism of injury: Patient notes his biggest deficit at home is attempting to carry things when walking  He has good endurance for standing/walking and no falls in the past month or near falls  He still uses a SPC for mobility especially in the evening and around the outside of his home  He desires to continue with HEP        Pain  Current pain ratin  At best pain ratin  At worst pain ratin          Objective     Strength/Myotome Testing     Left Hip   Planes of Motion   Flexion: 4+  Extension: 4+  Abduction: 4+  Adduction: 4+    Right Hip   Planes of Motion   Flexion: 4+  Extension: 4+  Abduction: 4+  Adduction: 4+    Left Knee   Flexion: 4+  Extension: 4+    Right Knee   Flexion: 4+  Extension: 4+    Left Ankle/Foot   Dorsiflexion: 4+  Plantar flexion: 4+    Right Ankle/Foot   Dorsiflexion: 4+  Plantar flexion: 4+    Functional Assessment     Comments  Tandem stance: loss of balance 8 seconds  Standing feet together firm surface/eyes closed: moderate sway  Single limb stance: left LE: 3 seconds, loss of balance, right LE: 4 seconds, loss of balance    General Comments     Lumbar Comments  Gait: Increased B knee flexion and shorter B step lengths  Stairs: Reciprocal, use of handrails  Squat: SL 85  SLS: WFL  Tandem Stance: WFL  Toribio Balance Scale: 50/56- difficulty with turning  5 Times Sit <--> Stand 11 2 sec, no UE assistance          Precautions: Parkinson's disease, Aortic valve replacement - 2009    Daily Treatment Diary   Exercise Diary           Bike    6'  5 '  5'  5'  5'    5'         Mini squats HEP  20x  20x 20x  20x  20x  20x         HR/TR    30 ea  30ea  30x  30x  30x  30x         Tandem stance HEP  foam2' ea   2' ea  2' ea  2' ea  2'  2'         S/S stepping    4 laps @ bar  4 laps  4 laps  4 laps  4 laps  4 laps         Tandem stepping    4 laps @ bar  4 laps  4 laps  4 laps  4 laps  4 laps       Biodex - weight shift - s/s + f/b    static 3' ea   Held unable  np  np             Biodex - LOS    static 3x Held unable  static 1x  static 1x  L10 2x  L10 2x         SLR flex HEP  20 ea  20 ea   20x  20x   20x  20x         SLR abd HEP  20 ea  20 ea  20x  20x   20x  20x         Bridging    3"x20  3"x20  5"x20  5"20x  :05/20x  :05 20         Iso hip add     5"x20  5"x20  5"x20  5" 20x  :05/20x :05 20         Biodex Maze          L10 easy 1x  L10 med          Biodex Random             Easy 2'

## 2018-09-13 ENCOUNTER — EVALUATION (OUTPATIENT)
Dept: PHYSICAL THERAPY | Facility: MEDICAL CENTER | Age: 68
End: 2018-09-13
Payer: OTHER GOVERNMENT

## 2018-09-13 DIAGNOSIS — R42 DIZZINESS: ICD-10-CM

## 2018-09-13 DIAGNOSIS — R26.89 BALANCE PROBLEM: Primary | ICD-10-CM

## 2018-09-13 PROCEDURE — 97112 NEUROMUSCULAR REEDUCATION: CPT | Performed by: PHYSICAL THERAPIST

## 2018-09-13 NOTE — PROGRESS NOTES
Daily Note     Today's date: 2018  Patient name: Mayo De La Fuente  : 1950  MRN: [id number]  Referring provider: Ladan Aceves MD  Dx:   Encounter Diagnosis     ICD-10-CM    1  Balance problem R26 89    2  Dizziness R42                   Subjective: Pt feels ready for HEP program      Objective: See treatment diary below      Assessment: Tolerated treatment well  Patient demonstrated fatigue post treatment      Plan: Pt to DC to HEP    Precautions: Parkinson's disease, Aortic valve replacement - 2009    Daily Treatment Diary   Exercise Diary         Bike    6'  5 '  5'  5'  5'    5'  5'       Mini squats HEP  20x  20x 20x  20x  20x  20x  20x       HR/TR    30 ea  30ea  30x  30x  30x  30x  30x       Tandem stance HEP  foam2' ea   2' ea  2' ea  2' ea  2'  2'  2'       S/S stepping    4 laps @ bar  4 laps  4 laps  4 laps  4 laps  4 laps  4 laps foam       Tandem stepping    4 laps @ bar  4 laps  4 laps  4 laps  4 laps  4 laps  4 laps foam       Biodex - weight shift - s/s + f/b    static 3' ea   Held unable  np  np             Biodex - LOS    static 3x Held unable  static 1x  static 1x  L10 2x  L10 2x L10 2x       SLR flex HEP  20 ea  20 ea   20x  20x   20x  20x 20x       SLR abd HEP  20 ea  20 ea  20x  20x   20x  20x  20x       Bridging    3"x20  3"x20  5"x20  5"20x  :05/20x  :05 20 :05/20x       Iso hip add     5"x20  5"x20  5"x20  5" 20x  :05/20x :05 20  :05 20       Biodex Maze          L10 easy 1x  L10 med  [place] med       Biodex Random             Easy 2'  med 2'